# Patient Record
Sex: MALE | Race: BLACK OR AFRICAN AMERICAN | NOT HISPANIC OR LATINO | URBAN - METROPOLITAN AREA
[De-identification: names, ages, dates, MRNs, and addresses within clinical notes are randomized per-mention and may not be internally consistent; named-entity substitution may affect disease eponyms.]

---

## 2017-01-16 ENCOUNTER — EMERGENCY (EMERGENCY)
Facility: HOSPITAL | Age: 52
LOS: 1 days | Discharge: HOME | End: 2017-01-16
Attending: EMERGENCY MEDICINE
Payer: MEDICAID

## 2017-01-16 VITALS
SYSTOLIC BLOOD PRESSURE: 169 MMHG | TEMPERATURE: 99 F | OXYGEN SATURATION: 99 % | HEART RATE: 76 BPM | RESPIRATION RATE: 20 BRPM | DIASTOLIC BLOOD PRESSURE: 69 MMHG

## 2017-01-16 LAB
ALBUMIN SERPL ELPH-MCNC: 4.1 G/DL — SIGNIFICANT CHANGE UP (ref 3.3–5.2)
ALP SERPL-CCNC: 61 U/L — SIGNIFICANT CHANGE UP (ref 40–120)
ALT FLD-CCNC: 22 U/L — SIGNIFICANT CHANGE UP
ANION GAP SERPL CALC-SCNC: 11 MMOL/L — SIGNIFICANT CHANGE UP (ref 5–17)
AST SERPL-CCNC: 26 U/L — SIGNIFICANT CHANGE UP
BASOPHILS # BLD AUTO: 0 K/UL — SIGNIFICANT CHANGE UP (ref 0–0.2)
BASOPHILS NFR BLD AUTO: 0.5 % — SIGNIFICANT CHANGE UP (ref 0–2)
BILIRUB SERPL-MCNC: 2.3 MG/DL — HIGH (ref 0.4–2)
BUN SERPL-MCNC: 18 MG/DL — SIGNIFICANT CHANGE UP (ref 8–20)
CALCIUM SERPL-MCNC: 9.2 MG/DL — SIGNIFICANT CHANGE UP (ref 8.6–10.2)
CHLORIDE SERPL-SCNC: 103 MMOL/L — SIGNIFICANT CHANGE UP (ref 98–107)
CO2 SERPL-SCNC: 29 MMOL/L — SIGNIFICANT CHANGE UP (ref 22–29)
CREAT SERPL-MCNC: 1.02 MG/DL — SIGNIFICANT CHANGE UP (ref 0.5–1.3)
EOSINOPHIL # BLD AUTO: 0.1 K/UL — SIGNIFICANT CHANGE UP (ref 0–0.5)
EOSINOPHIL NFR BLD AUTO: 2.3 % — SIGNIFICANT CHANGE UP (ref 0–6)
GLUCOSE SERPL-MCNC: 100 MG/DL — SIGNIFICANT CHANGE UP (ref 70–115)
HCT VFR BLD CALC: 38.2 % — LOW (ref 42–52)
HGB BLD-MCNC: 12.8 G/DL — LOW (ref 14–18)
LIDOCAIN IGE QN: 23 U/L — SIGNIFICANT CHANGE UP (ref 22–51)
LYMPHOCYTES # BLD AUTO: 2 K/UL — SIGNIFICANT CHANGE UP (ref 1–4.8)
LYMPHOCYTES # BLD AUTO: 45.9 % — SIGNIFICANT CHANGE UP (ref 20–55)
MCHC RBC-ENTMCNC: 31.6 PG — HIGH (ref 27–31)
MCHC RBC-ENTMCNC: 33.5 G/DL — SIGNIFICANT CHANGE UP (ref 32–36)
MCV RBC AUTO: 94.3 FL — HIGH (ref 80–94)
MONOCYTES # BLD AUTO: 0.4 K/UL — SIGNIFICANT CHANGE UP (ref 0–0.8)
MONOCYTES NFR BLD AUTO: 10.1 % — HIGH (ref 3–10)
NEUTROPHILS # BLD AUTO: 1.8 K/UL — SIGNIFICANT CHANGE UP (ref 1.8–8)
NEUTROPHILS NFR BLD AUTO: 41.2 % — SIGNIFICANT CHANGE UP (ref 37–73)
PLATELET # BLD AUTO: 205 K/UL — SIGNIFICANT CHANGE UP (ref 150–400)
POTASSIUM SERPL-MCNC: 3.6 MMOL/L — SIGNIFICANT CHANGE UP (ref 3.5–5.3)
POTASSIUM SERPL-SCNC: 3.6 MMOL/L — SIGNIFICANT CHANGE UP (ref 3.5–5.3)
PROT SERPL-MCNC: 7 G/DL — SIGNIFICANT CHANGE UP (ref 6.6–8.7)
RBC # BLD: 4.05 M/UL — LOW (ref 4.6–6.2)
RBC # FLD: 11.9 % — SIGNIFICANT CHANGE UP (ref 11–15.6)
SODIUM SERPL-SCNC: 143 MMOL/L — SIGNIFICANT CHANGE UP (ref 135–145)
WBC # BLD: 4.34 K/UL — LOW (ref 4.8–10.8)
WBC # FLD AUTO: 4.34 K/UL — LOW (ref 4.8–10.8)

## 2017-01-16 PROCEDURE — 99284 EMERGENCY DEPT VISIT MOD MDM: CPT | Mod: 25

## 2017-01-16 PROCEDURE — 85027 COMPLETE CBC AUTOMATED: CPT

## 2017-01-16 PROCEDURE — 99284 EMERGENCY DEPT VISIT MOD MDM: CPT

## 2017-01-16 PROCEDURE — 76705 ECHO EXAM OF ABDOMEN: CPT

## 2017-01-16 PROCEDURE — 80053 COMPREHEN METABOLIC PANEL: CPT

## 2017-01-16 PROCEDURE — 76705 ECHO EXAM OF ABDOMEN: CPT | Mod: 26

## 2017-01-16 PROCEDURE — 83690 ASSAY OF LIPASE: CPT

## 2017-01-16 PROCEDURE — 96374 THER/PROPH/DIAG INJ IV PUSH: CPT

## 2017-01-16 RX ORDER — KETOROLAC TROMETHAMINE 30 MG/ML
30 SYRINGE (ML) INJECTION ONCE
Qty: 0 | Refills: 0 | Status: DISCONTINUED | OUTPATIENT
Start: 2017-01-16 | End: 2017-01-16

## 2017-01-16 RX ADMIN — Medication 30 MILLIGRAM(S): at 13:15

## 2017-01-16 NOTE — ED STATDOCS - NS ED MD SCRIBE ATTENDING SCRIBE SECTIONS
INTAKE ASSESSMENT/SCREENINGS/REVIEW OF SYSTEMS/PHYSICAL EXAM/PAST MEDICAL/SURGICAL/SOCIAL HISTORY/DISPOSITION/HIV/VITAL SIGNS( Pullset)/HISTORY OF PRESENT ILLNESS

## 2017-01-16 NOTE — ED STATDOCS - OBJECTIVE STATEMENT
50 y/o male with hx of migraines, HTN presents to ED c/o HA and right sided abd pain onset 1 week. Pt describes the HA as cramps, all throughout the head but mostly the right part. HA is exacerbated with movement. Pt has been medicated for the HA previously. Denies fever, N/V/D. Pt took Tylenol this morning, but with no relief. Pt feels like he might have pulled something on the right side; pain is exacerbated when he lays on the side or pulls forward. Last year pt has a CT of the brain for the HA. No further complaints at this time. PMD: Prateek 52 y/o male with hx of migraines, HTN presents to ED c/o HA and right sided abd pain onset 1 week. Pt describes the HA as cramps, all throughout the head but mostly the right part. HA is exacerbated with movement. Pt reports multiple CTs in past for evaluation but denies consultation with neurology. Denies fever, N/V/D. Pt took Tylenol this morning, but with no relief. Patient also feels like there is a "swollen vein" on the right lower anterior chest/ RUQ since tuesday: hurts to touch.  Pt feels like he might have pulled something on the right side; pain is exacerbated when he lays on the side or pulls forward.  No further complaints at this time. PMD: Prateek

## 2017-01-16 NOTE — ED STATDOCS - CARE PLAN
Principal Discharge DX:	Right upper quadrant abdominal pain  Secondary Diagnosis:	Migraine without status migrainosus, not intractable, unspecified migraine type

## 2017-01-16 NOTE — ED STATDOCS - ENMT, MLM
Nontender cervical lymphadenopathy. TMs clear bilaterally. Moist mucous membranes. No pharyngeal erythema. Sinus percussion tenderness.

## 2017-01-16 NOTE — ED STATDOCS - CONDUCTED A DETAILED DISCUSSION WITH PATIENT AND/OR GUARDIAN REGARDING, MDM
return to ED if symptoms worsen, persist or questions arise/need for outpatient follow-up/lab results/radiology results

## 2017-01-16 NOTE — ED STATDOCS - PROGRESS NOTE DETAILS
PA NOTE: Pt seen by intake physician and orders/plan reviewed. PT is a 52yo male with PMHx of HTN on unknown medication and migraines presenting to ED with complaints of migraine and RUQ pain x 4 days. Pt reports his migraine is his normal migraine at baseline but is lasting longer. Pt took tylenol for the pain which provided minimal relief. Pt endorses RUQ pain that feels like "muscular" pain. Pain is exacerbated by movement. Pt denies fever, chills, N/V/D, hx of abx surgery, sore throat, ear pain, cough, nasal congestion, dizziness, blurred vision, LOC, recent travel, trauma. NKDA  PE: GEN: Awake, alert,  NAD,  EYES: PERRL CARDIAC: Reg rate and rhythm, S1,S2, RRR  RESP: No distress noted. Lungs CTA bilaterally no wheeze, ronchi, rales. ABD: +BS X4, soft, supple, + RUQ TTP, no guarding. negative rossi, no CVAT . NEURO: AOx3, no focal deficits   PLAN: Pt is a 52yo male with RUQ pain and migraine. Will provide symptom control and follow up labs/ radiology results. will re-evaluate. Pt stable and reports improvement of symptoms. Labs and US reviewed. US negative for gallstones or any acute findings. Pt advised to follow up with PMD. Pt verbalized understanding and agreement with plan and diagnosis. Discussed with attending. will d/c.

## 2017-01-16 NOTE — ED STATDOCS - ATTENDING CONTRIBUTION TO CARE
I, Vu Stephens, performed the initial face to face bedside interview with this patient regarding history of present illness, review of symptoms and relevant past medical, social and family history.  I completed an independent physical examination.  I was the provider who initially evaluated this patient.  The history, relevant review of systems, past medical and surgical history, medical decision making, and physical examination was documented by the scribe in my presence and I attest to the accuracy of the documentation. Follow-up on ordered tests (ie labs, radiologic studies) and re-evaluation of the patient's status has been communicated to the ACP.  Disposition of the patient will be based on test outcome and response to ED interventions.

## 2017-01-16 NOTE — ED STATDOCS - EYES, MLM
clear bilaterally.  Pupils 3mm, equal, round, and reactive to light. Funduscopic exam shows no papal edema. EOMI.

## 2017-01-16 NOTE — ED ADULT TRIAGE NOTE - CHIEF COMPLAINT QUOTE
Patient arrived to ED today with c/o migraine headache for the past 3 days, and right sided abdominal pain.

## 2017-03-09 ENCOUNTER — EMERGENCY (EMERGENCY)
Facility: HOSPITAL | Age: 52
LOS: 1 days | Discharge: HOME | End: 2017-03-09
Attending: EMERGENCY MEDICINE
Payer: MEDICAID

## 2017-03-09 VITALS
WEIGHT: 190.04 LBS | TEMPERATURE: 99 F | HEART RATE: 80 BPM | SYSTOLIC BLOOD PRESSURE: 180 MMHG | OXYGEN SATURATION: 100 % | HEIGHT: 71 IN | DIASTOLIC BLOOD PRESSURE: 143 MMHG | RESPIRATION RATE: 18 BRPM

## 2017-03-09 DIAGNOSIS — I10 ESSENTIAL (PRIMARY) HYPERTENSION: ICD-10-CM

## 2017-03-09 DIAGNOSIS — M54.5 LOW BACK PAIN: ICD-10-CM

## 2017-03-09 LAB
ALBUMIN SERPL ELPH-MCNC: 4 G/DL — SIGNIFICANT CHANGE UP (ref 3.3–5.2)
ALP SERPL-CCNC: 71 U/L — SIGNIFICANT CHANGE UP (ref 40–120)
ALT FLD-CCNC: 13 U/L — SIGNIFICANT CHANGE UP
ANION GAP SERPL CALC-SCNC: 9 MMOL/L — SIGNIFICANT CHANGE UP (ref 5–17)
APPEARANCE UR: CLEAR — SIGNIFICANT CHANGE UP
AST SERPL-CCNC: 14 U/L — SIGNIFICANT CHANGE UP
BASOPHILS # BLD AUTO: 0 K/UL — SIGNIFICANT CHANGE UP (ref 0–0.2)
BASOPHILS NFR BLD AUTO: 0.5 % — SIGNIFICANT CHANGE UP (ref 0–2)
BILIRUB SERPL-MCNC: 1.6 MG/DL — SIGNIFICANT CHANGE UP (ref 0.4–2)
BILIRUB UR-MCNC: NEGATIVE — SIGNIFICANT CHANGE UP
BUN SERPL-MCNC: 16 MG/DL — SIGNIFICANT CHANGE UP (ref 8–20)
CALCIUM SERPL-MCNC: 9 MG/DL — SIGNIFICANT CHANGE UP (ref 8.6–10.2)
CHLORIDE SERPL-SCNC: 104 MMOL/L — SIGNIFICANT CHANGE UP (ref 98–107)
CK MB CFR SERPL CALC: 2 NG/ML — SIGNIFICANT CHANGE UP (ref 0–6.7)
CK SERPL-CCNC: 153 U/L — SIGNIFICANT CHANGE UP (ref 30–200)
CO2 SERPL-SCNC: 28 MMOL/L — SIGNIFICANT CHANGE UP (ref 22–29)
COLOR SPEC: YELLOW — SIGNIFICANT CHANGE UP
CREAT SERPL-MCNC: 0.9 MG/DL — SIGNIFICANT CHANGE UP (ref 0.5–1.3)
DIFF PNL FLD: ABNORMAL
EOSINOPHIL # BLD AUTO: 0 K/UL — SIGNIFICANT CHANGE UP (ref 0–0.5)
EOSINOPHIL NFR BLD AUTO: 1.1 % — SIGNIFICANT CHANGE UP (ref 0–5)
EPI CELLS # UR: SIGNIFICANT CHANGE UP
GLUCOSE SERPL-MCNC: 124 MG/DL — HIGH (ref 70–115)
GLUCOSE UR QL: NEGATIVE MG/DL — SIGNIFICANT CHANGE UP
HCT VFR BLD CALC: 38 % — LOW (ref 42–52)
HGB BLD-MCNC: 12.6 G/DL — LOW (ref 14–18)
KETONES UR-MCNC: NEGATIVE — SIGNIFICANT CHANGE UP
LEUKOCYTE ESTERASE UR-ACNC: NEGATIVE — SIGNIFICANT CHANGE UP
LYMPHOCYTES # BLD AUTO: 2.1 K/UL — SIGNIFICANT CHANGE UP (ref 1–4.8)
LYMPHOCYTES # BLD AUTO: 47.7 % — SIGNIFICANT CHANGE UP (ref 20–55)
MCHC RBC-ENTMCNC: 31.8 PG — HIGH (ref 27–31)
MCHC RBC-ENTMCNC: 33.2 G/DL — SIGNIFICANT CHANGE UP (ref 32–36)
MCV RBC AUTO: 96 FL — HIGH (ref 80–94)
MONOCYTES # BLD AUTO: 0.3 K/UL — SIGNIFICANT CHANGE UP (ref 0–0.8)
MONOCYTES NFR BLD AUTO: 6.5 % — SIGNIFICANT CHANGE UP (ref 3–10)
NEUTROPHILS # BLD AUTO: 2 K/UL — SIGNIFICANT CHANGE UP (ref 1.8–8)
NEUTROPHILS NFR BLD AUTO: 44 % — SIGNIFICANT CHANGE UP (ref 37–73)
NITRITE UR-MCNC: NEGATIVE — SIGNIFICANT CHANGE UP
PH UR: 8 — SIGNIFICANT CHANGE UP (ref 4.8–8)
PLATELET # BLD AUTO: 197 K/UL — SIGNIFICANT CHANGE UP (ref 150–400)
POTASSIUM SERPL-MCNC: 3.4 MMOL/L — LOW (ref 3.5–5.3)
POTASSIUM SERPL-SCNC: 3.4 MMOL/L — LOW (ref 3.5–5.3)
PROT SERPL-MCNC: 7 G/DL — SIGNIFICANT CHANGE UP (ref 6.6–8.7)
PROT UR-MCNC: NEGATIVE MG/DL — SIGNIFICANT CHANGE UP
RBC # BLD: 3.96 M/UL — LOW (ref 4.6–6.2)
RBC # FLD: 12.1 % — SIGNIFICANT CHANGE UP (ref 11–15.6)
RBC CASTS # UR COMP ASSIST: ABNORMAL /HPF (ref 0–4)
SODIUM SERPL-SCNC: 141 MMOL/L — SIGNIFICANT CHANGE UP (ref 135–145)
SP GR SPEC: 1.02 — SIGNIFICANT CHANGE UP (ref 1.01–1.02)
TROPONIN T SERPL-MCNC: <0.01 NG/ML — SIGNIFICANT CHANGE UP (ref 0–0.06)
UROBILINOGEN FLD QL: NEGATIVE MG/DL — SIGNIFICANT CHANGE UP
WBC # BLD: 4.4 K/UL — LOW (ref 4.8–10.8)
WBC # FLD AUTO: 4.4 K/UL — LOW (ref 4.8–10.8)
WBC UR QL: SIGNIFICANT CHANGE UP

## 2017-03-09 PROCEDURE — 93010 ELECTROCARDIOGRAM REPORT: CPT

## 2017-03-09 PROCEDURE — 70450 CT HEAD/BRAIN W/O DYE: CPT | Mod: 26

## 2017-03-09 PROCEDURE — 71010: CPT | Mod: 26

## 2017-03-09 PROCEDURE — 99284 EMERGENCY DEPT VISIT MOD MDM: CPT

## 2017-03-09 PROCEDURE — 99285 EMERGENCY DEPT VISIT HI MDM: CPT

## 2017-03-09 RX ORDER — ONDANSETRON 8 MG/1
4 TABLET, FILM COATED ORAL ONCE
Qty: 0 | Refills: 0 | Status: COMPLETED | OUTPATIENT
Start: 2017-03-09 | End: 2017-03-09

## 2017-03-09 RX ORDER — LABETALOL HCL 100 MG
40 TABLET ORAL ONCE
Qty: 0 | Refills: 0 | Status: COMPLETED | OUTPATIENT
Start: 2017-03-09 | End: 2017-03-09

## 2017-03-09 RX ORDER — ACETAMINOPHEN 500 MG
650 TABLET ORAL ONCE
Qty: 0 | Refills: 0 | Status: COMPLETED | OUTPATIENT
Start: 2017-03-09 | End: 2017-03-09

## 2017-03-09 RX ORDER — LABETALOL HCL 100 MG
20 TABLET ORAL ONCE
Qty: 0 | Refills: 0 | Status: COMPLETED | OUTPATIENT
Start: 2017-03-09 | End: 2017-03-09

## 2017-03-09 RX ORDER — SODIUM CHLORIDE 9 MG/ML
3 INJECTION INTRAMUSCULAR; INTRAVENOUS; SUBCUTANEOUS ONCE
Qty: 0 | Refills: 0 | Status: COMPLETED | OUTPATIENT
Start: 2017-03-09 | End: 2017-03-09

## 2017-03-09 RX ORDER — MORPHINE SULFATE 50 MG/1
4 CAPSULE, EXTENDED RELEASE ORAL ONCE
Qty: 0 | Refills: 0 | Status: DISCONTINUED | OUTPATIENT
Start: 2017-03-09 | End: 2017-03-09

## 2017-03-09 RX ORDER — AMLODIPINE BESYLATE 2.5 MG/1
10 TABLET ORAL ONCE
Qty: 0 | Refills: 0 | Status: COMPLETED | OUTPATIENT
Start: 2017-03-09 | End: 2017-03-09

## 2017-03-09 RX ORDER — HYDRALAZINE HCL 50 MG
20 TABLET ORAL ONCE
Qty: 0 | Refills: 0 | Status: COMPLETED | OUTPATIENT
Start: 2017-03-09 | End: 2017-03-09

## 2017-03-09 RX ADMIN — Medication 20 MILLIGRAM(S): at 22:10

## 2017-03-09 RX ADMIN — Medication 20 MILLIGRAM(S): at 20:35

## 2017-03-09 RX ADMIN — ONDANSETRON 4 MILLIGRAM(S): 8 TABLET, FILM COATED ORAL at 23:57

## 2017-03-09 RX ADMIN — MORPHINE SULFATE 4 MILLIGRAM(S): 50 CAPSULE, EXTENDED RELEASE ORAL at 20:34

## 2017-03-09 RX ADMIN — AMLODIPINE BESYLATE 10 MILLIGRAM(S): 2.5 TABLET ORAL at 23:26

## 2017-03-09 RX ADMIN — Medication 40 MILLIGRAM(S): at 21:05

## 2017-03-09 RX ADMIN — SODIUM CHLORIDE 3 MILLILITER(S): 9 INJECTION INTRAMUSCULAR; INTRAVENOUS; SUBCUTANEOUS at 20:37

## 2017-03-09 RX ADMIN — MORPHINE SULFATE 4 MILLIGRAM(S): 50 CAPSULE, EXTENDED RELEASE ORAL at 21:00

## 2017-03-09 NOTE — ED ADULT NURSE REASSESSMENT NOTE - NS ED NURSE REASSESS COMMENT FT1
Dr. Abdi made aware of UA=577/124 mmHg s/p Labeltalol.  Verbal order for Hydralazine 20 mg IVP once givren. To medicate as ordered. Pt. nsr on cm, kept comfortable in bed. Denies pain and discomfort at this time. Not in distress.

## 2017-03-09 NOTE — ED ADULT NURSE REASSESSMENT NOTE - NS ED NURSE REASSESS COMMENT FT1
Dr. Abdi made aware at 11:15 pm patient is complaining of nausea. Verbal order of Zofran 4 mg iv push once stat is given. To medicate as ordered.

## 2017-03-09 NOTE — ED PROVIDER NOTE - ENMT, MLM
Airway patent, Nasal mucosa clear. Mouth with normal mucosa. Throat has no vesicles, no oropharyngeal exudates and uvula is midline. Neck supple, no JVD.

## 2017-03-09 NOTE — ED PROVIDER NOTE - NS ED MD SCRIBE ATTENDING SCRIBE SECTIONS
HIV/HISTORY OF PRESENT ILLNESS/DISPOSITION/PHYSICAL EXAM/REVIEW OF SYSTEMS/VITAL SIGNS( Pullset)/PAST MEDICAL/SURGICAL/SOCIAL HISTORY

## 2017-03-09 NOTE — ED ADULT TRIAGE NOTE - CHIEF COMPLAINT QUOTE
PAtient BIBA, patient complaining of lower back pain and left lower rib pain, states started this AM, hypertensive

## 2017-03-09 NOTE — ED PROVIDER NOTE - MUSCULOSKELETAL, MLM
Reproducible infrascapular tenderness with paraspinal tenderness extends laterally to thoracic interior lateral .

## 2017-03-09 NOTE — ED PROVIDER NOTE - PROGRESS NOTE DETAILS
Pt observed in ED and serial BP's taken.  Pt feels well at this time.  Labs as noted. Repeat /82.  Pt stable for d/c.  Rx Norvasc 10 mg daily and Clonidine 0.1 mg bid with f/u PMD/Dr. Birmingham tomorrow.  Pt understands need to fu

## 2017-03-09 NOTE — ED ADULT NURSE NOTE - OBJECTIVE STATEMENT
Patient presents to Ed with c/o left lower rib and left lower back pain. Pt. states it started this am especially with movement. Dx: HTN.

## 2017-03-09 NOTE — ED PROVIDER NOTE - OBJECTIVE STATEMENT
51 y/o M with hx of HTN BIBA to the ED c/o L back pain (under scapula) with radiation to L flank today. Pt states he woke up this morning with sx. He states he is not complaint (stopped 6 months ago) with HTN medication, prescribed 15 years ago. Pt notes blurred vision x 2 weeks. Denies trauma, focal deficits, numbness/tingling, SOB, fever chills. He notes pain is exacerbated with movement. Non smoker, no EtOH use. NKDA. No further complaints at this time.

## 2017-03-10 VITALS
SYSTOLIC BLOOD PRESSURE: 144 MMHG | TEMPERATURE: 98 F | HEART RATE: 68 BPM | DIASTOLIC BLOOD PRESSURE: 82 MMHG | RESPIRATION RATE: 19 BRPM | OXYGEN SATURATION: 99 %

## 2017-03-10 PROCEDURE — 70450 CT HEAD/BRAIN W/O DYE: CPT

## 2017-03-10 PROCEDURE — 96374 THER/PROPH/DIAG INJ IV PUSH: CPT

## 2017-03-10 PROCEDURE — 82550 ASSAY OF CK (CPK): CPT

## 2017-03-10 PROCEDURE — 71045 X-RAY EXAM CHEST 1 VIEW: CPT

## 2017-03-10 PROCEDURE — 96375 TX/PRO/DX INJ NEW DRUG ADDON: CPT

## 2017-03-10 PROCEDURE — 80053 COMPREHEN METABOLIC PANEL: CPT

## 2017-03-10 PROCEDURE — 96376 TX/PRO/DX INJ SAME DRUG ADON: CPT

## 2017-03-10 PROCEDURE — 99284 EMERGENCY DEPT VISIT MOD MDM: CPT | Mod: 25

## 2017-03-10 PROCEDURE — 85027 COMPLETE CBC AUTOMATED: CPT

## 2017-03-10 PROCEDURE — 81001 URINALYSIS AUTO W/SCOPE: CPT

## 2017-03-10 PROCEDURE — 82553 CREATINE MB FRACTION: CPT

## 2017-03-10 PROCEDURE — 93005 ELECTROCARDIOGRAM TRACING: CPT

## 2017-03-10 PROCEDURE — 84484 ASSAY OF TROPONIN QUANT: CPT

## 2017-03-10 RX ORDER — AMLODIPINE BESYLATE 2.5 MG/1
1 TABLET ORAL
Qty: 15 | Refills: 0 | OUTPATIENT
Start: 2017-03-10 | End: 2017-03-25

## 2017-03-10 RX ADMIN — Medication 650 MILLIGRAM(S): at 02:00

## 2017-03-10 RX ADMIN — Medication 0.2 MILLIGRAM(S): at 01:59

## 2017-03-10 RX ADMIN — Medication 650 MILLIGRAM(S): at 02:36

## 2017-03-10 NOTE — ED ADULT NURSE REASSESSMENT NOTE - NS ED NURSE REASSESS COMMENT FT1
Yasmin Pyle and Analisa left their phone numbers in case of emergency, Lashanda ([phone]) Analisa ([phone])

## 2018-04-30 ENCOUNTER — EMERGENCY (EMERGENCY)
Facility: HOSPITAL | Age: 53
LOS: 1 days | Discharge: HOME | End: 2018-04-30
Attending: EMERGENCY MEDICINE
Payer: SELF-PAY

## 2018-04-30 VITALS
WEIGHT: 199.96 LBS | OXYGEN SATURATION: 98 % | DIASTOLIC BLOOD PRESSURE: 127 MMHG | SYSTOLIC BLOOD PRESSURE: 191 MMHG | HEART RATE: 98 BPM | HEIGHT: 74 IN | TEMPERATURE: 98 F | RESPIRATION RATE: 15 BRPM

## 2018-04-30 PROCEDURE — 70450 CT HEAD/BRAIN W/O DYE: CPT

## 2018-04-30 PROCEDURE — 70486 CT MAXILLOFACIAL W/O DYE: CPT | Mod: 26

## 2018-04-30 PROCEDURE — 90471 IMMUNIZATION ADMIN: CPT

## 2018-04-30 PROCEDURE — 99284 EMERGENCY DEPT VISIT MOD MDM: CPT | Mod: 25

## 2018-04-30 PROCEDURE — 12011 RPR F/E/E/N/L/M 2.5 CM/<: CPT

## 2018-04-30 PROCEDURE — 70486 CT MAXILLOFACIAL W/O DYE: CPT

## 2018-04-30 PROCEDURE — 70450 CT HEAD/BRAIN W/O DYE: CPT | Mod: 26

## 2018-04-30 PROCEDURE — 90715 TDAP VACCINE 7 YRS/> IM: CPT

## 2018-04-30 RX ORDER — TETANUS TOXOID, REDUCED DIPHTHERIA TOXOID AND ACELLULAR PERTUSSIS VACCINE, ADSORBED 5; 2.5; 8; 8; 2.5 [IU]/.5ML; [IU]/.5ML; UG/.5ML; UG/.5ML; UG/.5ML
0.5 SUSPENSION INTRAMUSCULAR ONCE
Qty: 0 | Refills: 0 | Status: COMPLETED | OUTPATIENT
Start: 2018-04-30 | End: 2018-04-30

## 2018-04-30 RX ADMIN — TETANUS TOXOID, REDUCED DIPHTHERIA TOXOID AND ACELLULAR PERTUSSIS VACCINE, ADSORBED 0.5 MILLILITER(S): 5; 2.5; 8; 8; 2.5 SUSPENSION INTRAMUSCULAR at 17:45

## 2018-04-30 NOTE — ED ADULT TRIAGE NOTE - CHIEF COMPLAINT QUOTE
pt with lac to left cheek after item fell off a shelf and hit him in the face, denies LOC, denies head injury. pt with hx HTN does not take any medications to control BP.

## 2018-04-30 NOTE — ED PROVIDER NOTE - OBJECTIVE STATEMENT
Patient is a 54 y/o male c/o of laceration to left cheek. 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 Patient denies LOC. Patient denies being on blood thinners. Patient denies knowing when last tetanus shot was. Patient denies any other complaints. Patient denies dizziness, lightheadedness, visual changes, nausea, vomiting, difficulty ambulating.

## 2018-04-30 NOTE — ED PROVIDER NOTE - ATTENDING CONTRIBUTION TO CARE
Dr. Ellis : I have personally seen and examined this patient at the bedside. I have fully participated in the care of this patient. I have reviewed all pertinent clinical information, including history, physical exam, plan and the PA's note and agree except as noted.      54 yo M pw laceration to left cheek sp window fell on his cheek. Patient denies LOC. no anticoagulation.  Denies f/c/n/v/cp/sob/palpitations/cough/abd.pain/d/c/dysuria/hematuria. no sick contacts/recent travel. unknown last tdap    PE:  head; atraumatic normocephalic  face: ttp to left maxilla mandible superficial 1cm lac to left cheek; mobile left upper maxillary molar - notes mobile prior to window falling needs to follow up with the dentist  eyes: perrla  Heart: rrr s1s2  lungs: ctab  abd: soft, nt nd + bs no rebound/guarding no cva ttp  le: no swelling no calf ttp  back: no midline cervical/thoracic/lumbar ttp    -->will fu ct maxface; tdap--reassess

## 2018-04-30 NOTE — ED ADULT NURSE NOTE - OBJECTIVE STATEMENT
Patient BIBA to ED today after a window fell on him at work today.  Patient states window was about 3-4 feet high on shelve and fell forward and hit him in the face.  Patient denies LOC.  Patient has laceration to his left check.  Patient c/o headache.  Patient denies blood thinner use.

## 2018-04-30 NOTE — ED PROVIDER NOTE - PHYSICAL EXAMINATION
General: Well appearing, sitting comfortably Head: Normocephalic, Atraumatic Eyes: PERRL, conjunctiva pink, sclera white bilaterally Neck: Neck supple Cardio: S1/S2, no murmurs Resp: Clear to auscultation bilaterally no wheezes, rales or rhonchi Abd: Soft, nontender, nondistended + BS MSK: FROM in all extremities Skin: Superificial Laceration noted to right cheek 1.0 cm, not actively bleeding Neuro: Alert and orientated, CN II-XII intact, finger to nose intact, neurovascularly intact, muscle strength fair, gait without ataxia

## 2018-04-30 NOTE — ED PROVIDER NOTE - PROGRESS NOTE DETAILS
Geremias, Reviewed results of CT maxillofacial and head, informed patient to follow up with dentist, patient stating tooth was loose before today, patient is aware, copy of results given to patient, pt explained results and d.c instructions, pt verbalizes understanding results and d.c instructions

## 2019-03-23 ENCOUNTER — EMERGENCY (EMERGENCY)
Facility: HOSPITAL | Age: 54
LOS: 1 days | Discharge: HOME | End: 2019-03-23
Attending: EMERGENCY MEDICINE
Payer: MEDICAID

## 2019-03-23 VITALS
HEIGHT: 73 IN | RESPIRATION RATE: 18 BRPM | WEIGHT: 210.1 LBS | SYSTOLIC BLOOD PRESSURE: 199 MMHG | HEART RATE: 89 BPM | OXYGEN SATURATION: 97 % | DIASTOLIC BLOOD PRESSURE: 150 MMHG | TEMPERATURE: 98 F

## 2019-03-23 VITALS
HEART RATE: 68 BPM | SYSTOLIC BLOOD PRESSURE: 175 MMHG | OXYGEN SATURATION: 100 % | RESPIRATION RATE: 17 BRPM | DIASTOLIC BLOOD PRESSURE: 119 MMHG

## 2019-03-23 PROCEDURE — 73110 X-RAY EXAM OF WRIST: CPT | Mod: 26,LT

## 2019-03-23 PROCEDURE — 73130 X-RAY EXAM OF HAND: CPT

## 2019-03-23 PROCEDURE — 90715 TDAP VACCINE 7 YRS/> IM: CPT

## 2019-03-23 PROCEDURE — 96365 THER/PROPH/DIAG IV INF INIT: CPT | Mod: XU

## 2019-03-23 PROCEDURE — 96375 TX/PRO/DX INJ NEW DRUG ADDON: CPT | Mod: XU

## 2019-03-23 PROCEDURE — 99284 EMERGENCY DEPT VISIT MOD MDM: CPT

## 2019-03-23 PROCEDURE — 73110 X-RAY EXAM OF WRIST: CPT

## 2019-03-23 PROCEDURE — 73130 X-RAY EXAM OF HAND: CPT | Mod: 26,LT

## 2019-03-23 PROCEDURE — 12001 RPR S/N/AX/GEN/TRNK 2.5CM/<: CPT | Mod: F3

## 2019-03-23 PROCEDURE — 99284 EMERGENCY DEPT VISIT MOD MDM: CPT | Mod: 25

## 2019-03-23 PROCEDURE — 90471 IMMUNIZATION ADMIN: CPT

## 2019-03-23 RX ORDER — HYDRALAZINE HCL 50 MG
10 TABLET ORAL ONCE
Qty: 0 | Refills: 0 | Status: COMPLETED | OUTPATIENT
Start: 2019-03-23 | End: 2019-03-23

## 2019-03-23 RX ORDER — CEFAZOLIN SODIUM 1 G
1000 VIAL (EA) INJECTION ONCE
Qty: 0 | Refills: 0 | Status: COMPLETED | OUTPATIENT
Start: 2019-03-23 | End: 2019-03-23

## 2019-03-23 RX ORDER — TETANUS TOXOID, REDUCED DIPHTHERIA TOXOID AND ACELLULAR PERTUSSIS VACCINE, ADSORBED 5; 2.5; 8; 8; 2.5 [IU]/.5ML; [IU]/.5ML; UG/.5ML; UG/.5ML; UG/.5ML
0.5 SUSPENSION INTRAMUSCULAR ONCE
Qty: 0 | Refills: 0 | Status: COMPLETED | OUTPATIENT
Start: 2019-03-23 | End: 2019-03-23

## 2019-03-23 RX ORDER — IBUPROFEN 200 MG
1 TABLET ORAL
Qty: 21 | Refills: 0 | OUTPATIENT
Start: 2019-03-23 | End: 2019-03-29

## 2019-03-23 RX ORDER — ISOSORBIDE MONONITRATE 60 MG/1
30 TABLET, EXTENDED RELEASE ORAL DAILY
Qty: 0 | Refills: 0 | Status: DISCONTINUED | OUTPATIENT
Start: 2019-03-23 | End: 2019-03-23

## 2019-03-23 RX ORDER — MORPHINE SULFATE 50 MG/1
4 CAPSULE, EXTENDED RELEASE ORAL ONCE
Qty: 0 | Refills: 0 | Status: DISCONTINUED | OUTPATIENT
Start: 2019-03-23 | End: 2019-03-23

## 2019-03-23 RX ADMIN — MORPHINE SULFATE 4 MILLIGRAM(S): 50 CAPSULE, EXTENDED RELEASE ORAL at 20:00

## 2019-03-23 RX ADMIN — Medication 10 MILLIGRAM(S): at 21:14

## 2019-03-23 RX ADMIN — MORPHINE SULFATE 4 MILLIGRAM(S): 50 CAPSULE, EXTENDED RELEASE ORAL at 21:22

## 2019-03-23 RX ADMIN — Medication 100 MILLIGRAM(S): at 20:02

## 2019-03-23 RX ADMIN — Medication 1000 MILLIGRAM(S): at 20:32

## 2019-03-23 RX ADMIN — TETANUS TOXOID, REDUCED DIPHTHERIA TOXOID AND ACELLULAR PERTUSSIS VACCINE, ADSORBED 0.5 MILLILITER(S): 5; 2.5; 8; 8; 2.5 SUSPENSION INTRAMUSCULAR at 20:00

## 2019-03-23 NOTE — ED PROVIDER NOTE - ATTENDING CONTRIBUTION TO CARE
54 year old man with left 4th digit partial amputation wit bony exposure.  Abx given.  Xray shows fracture.  Wound was repaired by Orthopedics covering hand and patient was strongly encouraged to follow-up with hand specialist as outpatient.  Patient was noted to be hypertensive in the ER with no chest pain, SOB, headache or vision loss.  He is non-complaint with medications.  Patient was given medication with improvement of his BP.  Patient encouraged to take his medications every day and follow-up with PMD.

## 2019-03-23 NOTE — ED ADULT TRIAGE NOTE - CHIEF COMPLAINT QUOTE
Patient arrived via EMS, awake, alert, and oriented times 3, breathing unlabored.  Left 4th finger possible degloving, bone visible and skin present as per EMS,  wrapped by EMS prior to ED arrival.  Patient non-compliant with HTN meds

## 2019-03-23 NOTE — ED PROVIDER NOTE - PROGRESS NOTE DETAILS
Pt repeat BP still elevated 190/120. Pt denies HA, CP, dizziness or SOB. Will give IV hydralazine and reeval. Pt educated on importance of BP control 2nd medication compliance. Repeat /108. Pt admits to not taking medication x 1 week, but has medication at home and will restart and f/u PMD Pt repeat BP still elevated 190/120. Pt denies HA, CP, dizziness or SOB. Will place pt on cardiac monitor and give IV hydralazine and reeval. Pt educated on importance of BP control and medication compliance. Pt verbalizes understanding Pt reports "not feeling well'. Pt states that he feels like he is going ot pass out. BP 160s/108 P 69 R16   Pt denies any pain, HA, CP. + nausea. No vomiting. Dr Perez made aware. IVF hydration started and 2L O2 via NC given. Pt with likely adverse rxn - will continue to monitor Pt reports "not feeling well'. Pt states that he feels like he is going ot pass out.   BP 160s/108 P 69 R16 O2 100%  Pt denies any pain, HA, CP. + nausea. No vomiting. Dr Perez made aware. IVF hydration started and 2L O2 via NC given. Pt with likely adverse rxn - will continue to monitor Pt reports feeling a little bit better with IVF hydration and supplemental O2. Pt signed out pending continued observation and dispo ZONIA Sage NOTE: Pt signed out from ZONIA Healy pending observation and re-evaluation.  Pt reports feeling better, no symptoms at this time, pt wants to go home. Denies dizziness, confusion, diaphoresis, headache, visual disturbances/changes, chest pain, SOB, nausea/vomiting, acute back/arm/jaw pain. Discussed elevated BP reading with Dr. De Los Santos, per attending recommendation will d/c home with PMD f/u for HTN and plastics f/u for finger.

## 2019-03-23 NOTE — ED PROVIDER NOTE - PMH
No pertinent past medical history <<----- Click to add NO pertinent Past Medical History HTN (hypertension)

## 2019-03-23 NOTE — ED PROVIDER NOTE - OBJECTIVE STATEMENT
53 yo M PMH HTN (noncompliance) presented to ED s/p injury with an electric snake while trying to snake through a pipe. Pt states that the snake retracted back and twisted around finger and wrist. dT unknown 53 yo M PMH HTN (noncompliance) presented to ED BIB EMS s/p injury with an electric snake while trying to snake through a pipe. Pt states that the snake retracted back and twisted around finger and wrist. dT unknown.

## 2019-03-23 NOTE — CONSULT NOTE ADULT - SUBJECTIVE AND OBJECTIVE BOX
Pt Name: GENTRY JEFFERSON    MRN: [id number]      Patient is a 54y Male presenting to the emergency department with a chief complaint of left 4th finger injury  Patient is a 54y old  Male who presents with a chief complaint of left 4th finger injury.  Patient is right hand dominant male who got finger stuck in electric plumbing snake at work which caused a degloving injury to proximal 4th finger with distal phalange fx.      HEALTH ISSUES - PROBLEM Dx: left 4th finger fx/laceration       .      REVIEW OF SYSTEMS      General:	    Skin/Breast:  	  Ophthalmologic:  	  ENMT:	    Respiratory and Thorax:  	  Cardiovascular:	    Gastrointestinal:	    Genitourinary:	    Musculoskeletal:	left 4th finger     Neurological:	    Psychiatric:	    Hematology/Lymphatics:	    Endocrine:	    Allergic/Immunologic:	    ROS is otherwise negative.    PAST MEDICAL & SURGICAL HISTORY:  PAST MEDICAL & SURGICAL HISTORY:  No pertinent past medical history      Allergies: No Known Allergies      Medications:     FAMILY HISTORY:  : non-contributory    Social History: denies drug/tobacco use    Ambulation: Walking independently              PHYSICAL EXAM:    Vital Signs Last 24 Hrs  T(C): 36.8 (23 Mar 2019 18:03), Max: 36.8 (23 Mar 2019 18:03)  T(F): 98.3 (23 Mar 2019 18:03), Max: 98.3 (23 Mar 2019 18:03)  HR: 89 (23 Mar 2019 18:03) (89 - 89)  BP: 199/150 (23 Mar 2019 18:03) (199/150 - 199/150)  BP(mean): --  RR: 18 (23 Mar 2019 18:03) (18 - 18)  SpO2: 97% (23 Mar 2019 18:03) (97% - 97%)  Daily Height in cm: 185.42 (23 Mar 2019 18:03)    Daily     Appearance: Alert, responsive, in no acute distress.    Neurological: Sensation is grossly intact to light touch. 5/5 motor function of all extremities. No focal deficits or weaknesses found.    Skin: no rash on visible skin. Skin is clean, dry and intact. No bleeding. No abrasions. No ulcerations.    Vascular: 2+ distal pulses. Cap refill < 2 sec. No signs of venous insuffiency or stasis. No extremity ulcerations. No cyanosis.    Musculoskeletal:         Left Upper Extremity: 4th finger positive nail avulsion with avulsion to nathan finger, distal skin and nail is attached to finger, appears to have bloodflow,  ROM DIP intact        Imaging Studies: left hand xray positive distal phalange fx     Case discussed with Dr. Cooper       Performed by:  Stevie Prince PA-C    Indication: finger injury/avulsion    Discussed attempting to suture and place nail back in place, patient aware skin may not attach and have blood supply and may fail requiring surgery.  patient was given option of possible surgical repair now or attempt to suture wound. patient aware of risk of failure and would like to attempt suture in ER   Consent: the risks and benefits of procedure discussed with patient, including the risk of bleeding, infection, and technical failure. The alternatives of performing the procedure also discussed. Written consent was obtained following the discussion.    Universal Protocol: A time out was performed and the correct patient and site were verified.    Digital block was performed using 7 cc of 1 % lidocaine. wound was thoroughly cleansed with copious amounts of sterile saline.  10 4-0 prolene sutures were placed along with 1 4-0 prolene to tac nail in place.   The site was bandaged with xeroform and 4x4/splinted with aluminum finger splint and no complications were noted. The patient tolerated the procedure well.      A/P:  Pt is a 54y Male who presents with a chief complaint of left 4th finger injury found to have avulsion of nail with avulsion of skin to distal nathan surface/tuft fx     PLAN:   * keep finger splinted and dry   *Ancef in ER IV  * PO augmentin x 10 days   * Outpatient follow up no later than 1 week with Dr. Cooper   * patient to return to ER if any sign of infection including discharge, erythema, warmth, fever, chills etc.

## 2019-03-23 NOTE — ED ADULT NURSE NOTE - OBJECTIVE STATEMENT
Pt was seen and evaluated by ER provider for Avulsion/degloving injury to L 4th finger.  Pt was seen by Chelo to reattach and will follow up with Dr Cooper, Hand surgeon on Monday.  Pt was noted to have high blood pressure and stated that his diastolic usually runs in the 140's, states he sees his PCP often to readjust his medication.  Pt denies CP, SOB or HA.

## 2019-03-23 NOTE — ED ADULT NURSE REASSESSMENT NOTE - NS ED NURSE REASSESS COMMENT FT1
Pt discharged to home with sister.  Pt verbalized and understanding of all dc instructions and will follow up with Dr Cooper (hand surgeon) on Monday.  Pt understands that he should not remove the hand dsg and it will be removed by Dr Cooper. Pt insisted on leaving at this time and will follow up with his PCP for his blood pressure.  Per Dr Perez, ok to dc pt with last charted pressure.

## 2019-09-05 NOTE — ED ADULT NURSE NOTE - PATIENT DISCHARGE SIGNATURE
Chief Complaint   Patient presents with    Hypertension     6 month f/u     Skin Problem     right side of mouth- red rash      1. Have you been to the ER, urgent care clinic since your last visit? Hospitalized since your last visit? No    2. Have you seen or consulted any other health care providers outside of the [address] [place] [address] since your last visit? Include any pap smears or colon screening. No    Health maintenance reviewed. Pt informed of health maintenance past due and/or upcoming. Pt verbalized understanding. Pt is fasting this visit. Conchita Castle is a 69 y.o. male who presents for routine immunizations. He denies any symptoms , reactions or allergies that would exclude them from being immunized today. Risks and adverse reactions were discussed and the VIS was given to them. All questions were addressed. He was observed for 5   min post injection. There were no reactions observed.     Dunia De La O LPN 10-Mar-2017

## 2020-02-24 PROBLEM — I10 ESSENTIAL (PRIMARY) HYPERTENSION: Chronic | Status: ACTIVE | Noted: 2019-03-23

## 2020-04-15 ENCOUNTER — APPOINTMENT (OUTPATIENT)
Dept: NEUROLOGY | Facility: CLINIC | Age: 55
End: 2020-04-15

## 2024-08-31 NOTE — ED PROVIDER NOTE - CONSTITUTIONAL DISTRESS
Del Calderon is a 21 year old male.   Chief Complaint   Patient presents with    Sinus Problem     Yb rm Sinus problem  heavy mucous face sore gums are sore headache       HPI:    Pt presents with recurrent sinus issues for the last 3 months. First developed a sinus infection in 5/2024 - treated with augmentin with incomplete resolution of his symptoms. Then treated with doxcycline in 6/2024. Majority of symptoms resolved but he continues to experience a scratchy throat on and off. Now for the last week he feels he has a sinus infection again - sore throat, sinus pain and pressure, thick drainage, foul tasting/smelling drainage, pain into gums/teeth. Denies cough and fever. Not currently taking anything otc.   Prior to may he very rarely got sick. No previous h/o sinus trouble, asthma, or allergies.        Allergies:  Allergies   Allergen Reactions    Other OTHER (SEE COMMENTS)    Dander ITCHING     CATS    Lactose DIARRHEA      Current Meds:  Current Outpatient Medications   Medication Sig Dispense Refill    cefdinir 300 MG Oral Cap Take 1 capsule (300 mg total) by mouth 2 (two) times daily. 20 capsule 0    rosuvastatin 40 MG Oral Tab Take 1 tablet (40 mg total) by mouth nightly. 90 tablet 1    clotrimazole-betamethasone 1-0.05 % External Cream Apply 1 Application topically 2 (two) times daily. (Patient not taking: Reported on 8/31/2024) 60 g 3        PMH:   No past medical history on file.    ROS:   Review of Systems   Constitutional:  Negative for chills, fever and malaise/fatigue.   HENT:  Positive for congestion, sinus pain and sore throat. Negative for ear discharge and ear pain.    Respiratory:  Negative for cough, sputum production and shortness of breath.    Cardiovascular:  Negative for chest pain.   Neurological:  Positive for headaches. Negative for dizziness.       PHYSICAL EXAM:    /62   Pulse 65   Temp 96.9 °F (36.1 °C) (Temporal)   Resp 20   Ht 5' 10\" (1.778 m)   Wt 152 lb 12.8 oz  (69.3 kg)   SpO2 98%   BMI 21.92 kg/m²     GENERAL: NAD. A&Ox3  HEENT: Ear canals clear, TMs pearly gray bilaterally. Throat without erythema or exudates.  NECK: No LAD.   RESPIRATORY: CTAB, no R/R/W  CV: RRR, no murmurs.   PSYCH: Appropriate mood and affect.      ASSESSMENT/ PLAN:   1. Acute non-recurrent maxillary sinusitis  Will treat acute infection with cefdinir bid x 10 days   Start flonase daily and continue this after acute symptoms have resolved to help prevent recurrence   If recurs again, then would discuss ENT vs sinus CT        Health Maintenance Due   Topic Date Due    HPV Vaccines (3 - Male 3-dose series) 09/24/2019    COVID-19 Vaccine (3 - 2023-24 season) 09/01/2023           Pt indicates understanding and agrees to the plan.     Return if symptoms worsen or fail to improve.    Eva Monique PA-C       MILD

## 2024-09-25 NOTE — ED PROVIDER NOTE - MUSCULOSKELETAL MINIMAL EXAM
[Father] : father
normal range of motion/left 4th digit degloving at the dip. ?bony exposure. Flexion and extension intact. + distal radius ttp. No defor

## 2024-10-05 ENCOUNTER — EMERGENCY (EMERGENCY)
Facility: HOSPITAL | Age: 59
LOS: 1 days | Discharge: HOME | End: 2024-10-05
Attending: EMERGENCY MEDICINE
Payer: MEDICAID

## 2024-10-05 VITALS
SYSTOLIC BLOOD PRESSURE: 150 MMHG | OXYGEN SATURATION: 100 % | RESPIRATION RATE: 17 BRPM | DIASTOLIC BLOOD PRESSURE: 108 MMHG | TEMPERATURE: 98 F | HEART RATE: 62 BPM

## 2024-10-05 VITALS
HEART RATE: 67 BPM | DIASTOLIC BLOOD PRESSURE: 94 MMHG | WEIGHT: 208.56 LBS | RESPIRATION RATE: 18 BRPM | TEMPERATURE: 98 F | HEIGHT: 77 IN | SYSTOLIC BLOOD PRESSURE: 133 MMHG

## 2024-10-05 DIAGNOSIS — R07.89 OTHER CHEST PAIN: ICD-10-CM

## 2024-10-05 LAB
ALBUMIN SERPL ELPH-MCNC: 4 G/DL — SIGNIFICANT CHANGE UP (ref 3.3–5.2)
ALP SERPL-CCNC: 67 U/L — SIGNIFICANT CHANGE UP (ref 40–120)
ALT FLD-CCNC: 25 U/L — SIGNIFICANT CHANGE UP
ANION GAP SERPL CALC-SCNC: 10 MMOL/L — SIGNIFICANT CHANGE UP (ref 5–17)
AST SERPL-CCNC: 22 U/L — SIGNIFICANT CHANGE UP
BASOPHILS # BLD AUTO: 0.05 K/UL — SIGNIFICANT CHANGE UP (ref 0–0.2)
BASOPHILS NFR BLD AUTO: 1.6 % — SIGNIFICANT CHANGE UP (ref 0–2)
BILIRUB SERPL-MCNC: 1.4 MG/DL — SIGNIFICANT CHANGE UP (ref 0.4–2)
BUN SERPL-MCNC: 16.9 MG/DL — SIGNIFICANT CHANGE UP (ref 8–20)
CALCIUM SERPL-MCNC: 9.5 MG/DL — SIGNIFICANT CHANGE UP (ref 8.4–10.5)
CHLORIDE SERPL-SCNC: 102 MMOL/L — SIGNIFICANT CHANGE UP (ref 96–108)
CO2 SERPL-SCNC: 26 MMOL/L — SIGNIFICANT CHANGE UP (ref 22–29)
CREAT SERPL-MCNC: 0.9 MG/DL — SIGNIFICANT CHANGE UP (ref 0.5–1.3)
D DIMER BLD IA.RAPID-MCNC: 318 NG/ML DDU — HIGH
EGFR: 97 ML/MIN/1.73M2 — SIGNIFICANT CHANGE UP
EOSINOPHIL # BLD AUTO: 0.05 K/UL — SIGNIFICANT CHANGE UP (ref 0–0.5)
EOSINOPHIL NFR BLD AUTO: 1.6 % — SIGNIFICANT CHANGE UP (ref 0–6)
GLUCOSE SERPL-MCNC: 92 MG/DL — SIGNIFICANT CHANGE UP (ref 70–99)
HCT VFR BLD CALC: 39.5 % — SIGNIFICANT CHANGE UP (ref 39–50)
HGB BLD-MCNC: 13.4 G/DL — SIGNIFICANT CHANGE UP (ref 13–17)
IMM GRANULOCYTES NFR BLD AUTO: 0.3 % — SIGNIFICANT CHANGE UP (ref 0–0.9)
INR BLD: 1.08 RATIO — SIGNIFICANT CHANGE UP (ref 0.85–1.16)
LYMPHOCYTES # BLD AUTO: 1.43 K/UL — SIGNIFICANT CHANGE UP (ref 1–3.3)
LYMPHOCYTES # BLD AUTO: 46.6 % — HIGH (ref 13–44)
MCHC RBC-ENTMCNC: 33.2 PG — SIGNIFICANT CHANGE UP (ref 27–34)
MCHC RBC-ENTMCNC: 33.9 GM/DL — SIGNIFICANT CHANGE UP (ref 32–36)
MCV RBC AUTO: 97.8 FL — SIGNIFICANT CHANGE UP (ref 80–100)
MONOCYTES # BLD AUTO: 0.3 K/UL — SIGNIFICANT CHANGE UP (ref 0–0.9)
MONOCYTES NFR BLD AUTO: 9.8 % — SIGNIFICANT CHANGE UP (ref 2–14)
NEUTROPHILS # BLD AUTO: 1.23 K/UL — LOW (ref 1.8–7.4)
NEUTROPHILS NFR BLD AUTO: 40.1 % — LOW (ref 43–77)
PLATELET # BLD AUTO: 209 K/UL — SIGNIFICANT CHANGE UP (ref 150–400)
POTASSIUM SERPL-MCNC: 4.3 MMOL/L — SIGNIFICANT CHANGE UP (ref 3.5–5.3)
POTASSIUM SERPL-SCNC: 4.3 MMOL/L — SIGNIFICANT CHANGE UP (ref 3.5–5.3)
PROT SERPL-MCNC: 6.6 G/DL — SIGNIFICANT CHANGE UP (ref 6.6–8.7)
PROTHROM AB SERPL-ACNC: 12.5 SEC — SIGNIFICANT CHANGE UP (ref 9.9–13.4)
RBC # BLD: 4.04 M/UL — LOW (ref 4.2–5.8)
RBC # FLD: 11.8 % — SIGNIFICANT CHANGE UP (ref 10.3–14.5)
SODIUM SERPL-SCNC: 138 MMOL/L — SIGNIFICANT CHANGE UP (ref 135–145)
TROPONIN T, HIGH SENSITIVITY RESULT: 25 NG/L — SIGNIFICANT CHANGE UP (ref 0–51)
TROPONIN T, HIGH SENSITIVITY RESULT: 7 NG/L — SIGNIFICANT CHANGE UP (ref 0–51)
WBC # BLD: 3.07 K/UL — LOW (ref 3.8–10.5)
WBC # FLD AUTO: 3.07 K/UL — LOW (ref 3.8–10.5)

## 2024-10-05 RX ORDER — ASPIRIN 325 MG
81 TABLET ORAL DAILY
Refills: 0 | Status: ACTIVE | OUTPATIENT
Start: 2024-10-05 | End: 2025-09-03

## 2024-10-05 RX ORDER — LIDOCAINE 50 MG/G
1 CREAM TOPICAL ONCE
Refills: 0 | Status: COMPLETED | OUTPATIENT
Start: 2024-10-05 | End: 2024-10-05

## 2024-10-05 RX ORDER — KETOROLAC TROMETHAMINE 10 MG/1
1 TABLET, FILM COATED ORAL
Qty: 20 | Refills: 0
Start: 2024-10-05 | End: 2024-10-09

## 2024-10-05 RX ORDER — KETOROLAC TROMETHAMINE 10 MG/1
30 TABLET, FILM COATED ORAL ONCE
Refills: 0 | Status: DISCONTINUED | OUTPATIENT
Start: 2024-10-05 | End: 2024-10-05

## 2024-10-05 RX ORDER — OXYCODONE HYDROCHLORIDE 30 MG/1
5 TABLET, FILM COATED, EXTENDED RELEASE ORAL ONCE
Refills: 0 | Status: DISCONTINUED | OUTPATIENT
Start: 2024-10-05 | End: 2024-10-05

## 2024-10-05 RX ORDER — LIDOCAINE 50 MG/G
10 CREAM TOPICAL ONCE
Refills: 0 | Status: COMPLETED | OUTPATIENT
Start: 2024-10-05 | End: 2024-10-05

## 2024-10-05 RX ORDER — MAG HYDROX/ALUMINUM HYD/SIMETH 200-200-20
30 SUSPENSION, ORAL (FINAL DOSE FORM) ORAL ONCE
Refills: 0 | Status: COMPLETED | OUTPATIENT
Start: 2024-10-05 | End: 2024-10-05

## 2024-10-05 RX ORDER — OXYCODONE AND ACETAMINOPHEN 5; 325 MG/1; MG/1
1 TABLET ORAL
Qty: 12 | Refills: 0
Start: 2024-10-05 | End: 2024-10-07

## 2024-10-05 RX ADMIN — Medication 81 MILLIGRAM(S): at 12:11

## 2024-10-05 RX ADMIN — LIDOCAINE 10 MILLILITER(S): 50 CREAM TOPICAL at 13:29

## 2024-10-05 RX ADMIN — KETOROLAC TROMETHAMINE 30 MILLIGRAM(S): 10 TABLET, FILM COATED ORAL at 20:24

## 2024-10-05 RX ADMIN — OXYCODONE HYDROCHLORIDE 5 MILLIGRAM(S): 30 TABLET, FILM COATED, EXTENDED RELEASE ORAL at 21:06

## 2024-10-05 RX ADMIN — Medication 30 MILLILITER(S): at 12:12

## 2024-10-05 RX ADMIN — Medication 1 TABLET(S): at 21:00

## 2024-10-05 NOTE — ED PROVIDER NOTE - OBJECTIVE STATEMENT
This is a 58 y/o male PMH of HTN now presenting to the ED with acute onset of left jaw pain associated with intermittent midsternal sharp chest pain that radiates to his left side.  Patient states jaw pain  has been on going for 3-months aggravated by talking and eating without any alleviating factors. Patient states chest pain started at 0600 am this morning and had a 10/10 jaw pain and came in for evaluation. Denies presence of associated chest pain in the past. Patient denies SOB, fevers, chills, nausea or vomiting.

## 2024-10-05 NOTE — ED PROVIDER NOTE - PHYSICAL EXAMINATION
General: Awake, alert, lying in bed in NAD  HEENT: + jaw pain upon palpation. Normocephalic, atraumatic. No scleral icterus or conjunctival injection.   Neck:. Soft and supple.  Cardiac: RRR, S1/S2 present  Resp: Symmetric chest expansion with inspiration. No accessory muscle use  Abd: Soft, non-tender, non-distended. No guarding, rebound, or rigidity.  Skin: No rashes, abrasions, or lacerations.  Extremities: Palpable DP pulses bilaterally. No LE edema.  Neuro: AO x 4. Moves all extremities symmetrically. Motor strength and sensation grossly intact.  Psych: Appropriate mood and affect

## 2024-10-05 NOTE — CONSULT NOTE ADULT - NS ATTEND AMEND GEN_ALL_CORE FT
I NEVER SAW THE PATIENT. PATIENT WAS DISCHARGED PRIOR TO ME SEEING HIM. NO BILL. AGREE WITH RECOMMENDATIONS ABOVE.

## 2024-10-05 NOTE — CONSULT NOTE ADULT - PROBLEM SELECTOR RECOMMENDATION 9
- patients main complaint is jaw pain.   - any chest pain is non anginal in nature and reproducible chest wall tenderness, elicited with palpation  - recent ear infection seemingly refractory to antibiotics now progressing to intermittent left side jaw pain radiating to the left ear.   - pt is physically active, with excellent functional capacity, denies angina/anginal equivalents  - follows with [facility] cardiology in [place] for HTN and LVH, can resume outpatient care with them   - Troponin is not consistent with delta trop as the trend is in the wrong direction. Trop is considered negative.   - d-dimer is negative but pt does not meet wells criteria for PE, no tachycardia/hypoxia.   - CTA is negative  - d-dimer likely elevated chronically secondary to inflammatory/infectious process  - pt needs ENT evaluation   - no further workup, we will sign off. - patients main complaint is jaw pain.   - any chest pain is non anginal in nature and reproducible chest wall tenderness, elicited with palpation  - recent ear infection seemingly refractory to antibiotics now progressing to intermittent left side jaw pain radiating to the left ear.   - pt is physically active, with excellent functional capacity, denies angina/anginal equivalents  - follows with [facility] cardiology in [place] for HTN and LVH, can resume outpatient care with them   - Troponin is not consistent with delta trop as the trend is in the wrong direction. Trop is considered negative.   - d-dimer is negative but pt does not meet wells criteria for PE, no tachycardia/hypoxia.   - CTA is negative  - d-dimer likely elevated chronically secondary to inflammatory/infectious process  - pt needs ENT evaluation   - no further workup, we will sign off.  - further plan of care and disposition is as per ED and not contingent upon cardiac eval as pt's primary complaint is non cardiac in nature

## 2024-10-05 NOTE — ED PROVIDER NOTE - CARE PROVIDER_API CALL
David Beasley  Head/Neck Surgery  [address], [address]  [place] [address]  Phone: [phone]  Fax: [phone]  Follow Up Time:     Juan Carlos Medina  Neurology  [address], [address]  [place] [address]  Phone: [phone]  Fax: [phone]  Follow Up Time:

## 2024-10-05 NOTE — ED PROVIDER NOTE - CLINICAL SUMMARY MEDICAL DECISION MAKING FREE TEXT BOX
60 yo male with htn with jaw pain for months and sscp ; eval for acs, msk vs pe;  labs, xray ct angio and reassess;  cardiology consult

## 2024-10-05 NOTE — CONSULT NOTE ADULT - SUBJECTIVE AND OBJECTIVE BOX
[facility]                                              CARDIOLOGY AT [address], [place] [address]                                             Telephone: [phone]. Fax:[phone]                                                       CARDIOLOGY CONSULTATION NOTE                                                                                             History obtained by: Patient and medical record   Community Cardiologist: [facility]    obtained: Yes [  ] No [  ]  Reason for Consultation: Chest pain   Available out pt records reviewed: Yes [  ] No [  ]    Chief complaint:    Patient is a 59y old  Male who presents with a chief complaint of left sided jaw pain     HPI:  This is a 60 y/o male PMH of HTN now presenting to the ED with acute onset of left jaw pain associated with intermittent midsternal sharp chest pain that radiates to his left side.  Patient states jaw pain  has been on going for 3-months aggravated by talking and eating without any alleviating factors. Patient states chest pain started at 0600 am this morning and had a 10/10 jaw pain and came in for evaluation. Denies presence of associated chest pain in the past. Patient denies SOB, fevers, chills, nausea or vomiting.    PAST MEDICAL HISTORY    PAST SURGICAL HISTORY    SUBSTANCE USE HISTORY  Denies current and previous substance use [  ]   CIGARETTES -   ALCOHOL -   DRUGS -     FAMILY HISTORY:    CARDIAC SPECIFIC FAMILY HX   No KNOWN family history of Cardiovascular disease, CAD, or sudden death in first degree relatives unless specified below  Family History of Cardiovascular Disease:  [  ]   Coronary Artery Disease in first degree relative:  [  ]   Sudden Cardiac Death in First degree relative: [  ]    HOME MEDICATIONS:    CURRENT CARDIAC MEDICATIONS:    CURRENT OTHER MEDICATIONS:  aspirin  chewable 81 milliGRAM(s) Oral daily    ALLERGIES:   No Known Allergies    VITAL SIGNS:  T(C): 36.6 (10-05-24 @ 15:31), Max: 36.8 (10-05-24 @ 09:45)  T(F): 97.8 (10-05-24 @ 15:31), Max: 98.3 (10-05-24 @ 09:45)  HR: 63 (10-05-24 @ 15:31) (63 - 67)  BP: 142/92 (10-05-24 @ 15:31) (133/94 - 142/92)  RR: 18 (10-05-24 @ 15:31) (18 - 18)  SpO2: 95% (10-05-24 @ 15:31) (95% - 95%)    INTAKE AND OUTPUT:    LABS:                        13.4   3.07  )-----------( 209      ( 05 Oct 2024 11:48 )             39.5     10-05    138  |  102  |  16.9  ----------------------------<  92  4.3   |  26.0  |  0.90    Ca    9.5      05 Oct 2024 11:48    TPro  6.6  /  Alb  4.0  /  TBili  1.4  /  DBili  x   /  AST  22  /  ALT  25  /  AlkPhos  67  10-05    PT/INR - ( 05 Oct 2024 11:48 )   PT: 12.5 sec;   INR: 1.08 ratio      Urinalysis Basic - ( 05 Oct 2024 11:48 )    Color: x / Appearance: x / SG: x / pH: x  Gluc: 92 mg/dL / Ketone: x  / Bili: x / Urobili: x   Blood: x / Protein: x / Nitrite: x   Leuk Esterase: x / RBC: x / WBC x   Sq Epi: x / Non Sq Epi: x / Bacteria: x    RADIOLOGY IMAGING:   CT Angio Chest PE Protocol w/ IV Cont: Urgent   Indication: chest pain  Transport: Stret[facility]rib  Exam Completed  Provider's Contact #: [phone] (10-05-24 @ 14:27) [Results Available]  Xray Chest 1 View- PORTABLE-Urgent: Urgent   Indication: Chest Pain  Transport: Portable  Exam Completed (10-05-24 @ 11:31) [Performed]  12 Lead ECG (10-05-24 @ 11:31) [Completed]  12 Lead ECG:   Provider's Contact #: [phone] (10-05-24 @ 10:12) [Completed]

## 2024-10-05 NOTE — ED PROVIDER NOTE - ATTENDING CONTRIBUTION TO CARE
58 y/o male PMH of HTN now presenting to the ED with acute onset of left jaw pain x 3 months  associated with intermittent midsternal sharp chest pain  x hours that radiates to his left side.  Patient states jaw pain  has been on going for 3-months aggravated by talking and eating without any alleviating factors.   pe awake alert heent  dental no signs of abscess neck supple cor s1 s2 lung clear abd soft nontender neuro nonfocal   dx chest pain

## 2024-10-05 NOTE — ED ADULT NURSE REASSESSMENT NOTE - NS ED NURSE REASSESS COMMENT FT1
Upon physical exam by ED resident, pt reports chest pain that started this morning. Stat EKG performed.  pt to go to main ED.

## 2024-10-05 NOTE — ED ADULT NURSE NOTE - OBJECTIVE STATEMENT
Pt presents to the ED A&Ox4 c/o left jaw pain x a few months. Pt denies chest/abdominal pain and SOB. Pt RR even and unlabored, NAD noted. Pt denies fever/chill/N/V.

## 2024-10-05 NOTE — ED PROVIDER NOTE - PATIENT PORTAL LINK FT
You can access the FollowMyHealth Patient Portal offered by [facility] by registering at the following website: [email or web address]. By joining [email or web address]’s FollowMyHealth portal, you will also be able to view your health information using other applications (apps) compatible with our system.

## 2024-10-05 NOTE — ED PROVIDER NOTE - CARE PROVIDERS DIRECT ADDRESSES
[email or web address]